# Patient Record
Sex: MALE | Race: WHITE | URBAN - METROPOLITAN AREA
[De-identification: names, ages, dates, MRNs, and addresses within clinical notes are randomized per-mention and may not be internally consistent; named-entity substitution may affect disease eponyms.]

---

## 2018-03-08 ENCOUNTER — HOSPITAL ENCOUNTER (OUTPATIENT)
Dept: ENDOSCOPY | Age: 68
Discharge: HOME | End: 2018-03-08
Attending: INTERNAL MEDICINE | Admitting: INTERNAL MEDICINE

## 2018-03-08 NOTE — ANESTHESIA PRE-OP
Department of Anesthesiology  Preprocedure Note       Name:  Christophe Morales   Age:  79 y.o.  :  1950                                          MRN:  [id number]         Date:  3/8/2018      Surgeon:    Procedure:    Medications prior to admission:   Prior to Admission medications    Medication Sig Start Date End Date Taking? Authorizing Provider   NAPROXEN PO Take by mouth as needed    Historical Provider, MD   LISINOPRIL PO Take by mouth daily    Historical Provider, MD   DOXAZOSIN MESYLATE PO Take by mouth daily    Historical Provider, MD   Omega-3 Fatty Acids (FISH OIL BURP-LESS PO) Take by mouth daily    Historical Provider, MD   Multiple Vitamins-Minerals (THERAPEUTIC MULTIVITAMIN-MINERALS) tablet Take 1 tablet by mouth daily    Historical Provider, MD       Current medications:    Current Outpatient Prescriptions   Medication Sig Dispense Refill    NAPROXEN PO Take by mouth as needed      LISINOPRIL PO Take by mouth daily      DOXAZOSIN MESYLATE PO Take by mouth daily      Omega-3 Fatty Acids (FISH OIL BURP-LESS PO) Take by mouth daily      Multiple Vitamins-Minerals (THERAPEUTIC MULTIVITAMIN-MINERALS) tablet Take 1 tablet by mouth daily       No current facility-administered medications for this encounter. Allergies: Allergies   Allergen Reactions    Pcn [Penicillins] Itching and Rash       Problem List:  There is no problem list on file for this patient. Past Medical History:        Diagnosis Date    H/O blood clots     prob.related to flying    Hypertension        Past Surgical History:        Procedure Laterality Date    PROSTATE BIOPSY      developed infection afterward    VENA CAVA FILTER PLACEMENT         Social History:    Social History   Substance Use Topics    Smoking status: Not on file    Smokeless tobacco: Not on file    Alcohol use Not on file                                Counseling given: Not Answered      Vital Signs (Current):  There were no vitals